# Patient Record
Sex: FEMALE | Race: WHITE | NOT HISPANIC OR LATINO | Employment: OTHER | URBAN - METROPOLITAN AREA
[De-identification: names, ages, dates, MRNs, and addresses within clinical notes are randomized per-mention and may not be internally consistent; named-entity substitution may affect disease eponyms.]

---

## 2023-03-02 ENCOUNTER — PES CALL (OUTPATIENT)
Dept: ADMINISTRATIVE | Facility: CLINIC | Age: 78
End: 2023-03-02
Payer: MEDICARE

## 2023-04-18 ENCOUNTER — OFFICE VISIT (OUTPATIENT)
Dept: URGENT CARE | Facility: CLINIC | Age: 78
End: 2023-04-18
Payer: MEDICARE

## 2023-04-18 VITALS
DIASTOLIC BLOOD PRESSURE: 90 MMHG | WEIGHT: 125 LBS | HEART RATE: 119 BPM | OXYGEN SATURATION: 96 % | TEMPERATURE: 98 F | SYSTOLIC BLOOD PRESSURE: 164 MMHG | RESPIRATION RATE: 16 BRPM

## 2023-04-18 DIAGNOSIS — R35.0 FREQUENCY OF URINATION: Primary | ICD-10-CM

## 2023-04-18 LAB
BILIRUB UR QL STRIP: NEGATIVE
GLUCOSE UR QL STRIP: NEGATIVE
KETONES UR QL STRIP: NEGATIVE
LEUKOCYTE ESTERASE UR QL STRIP: NEGATIVE
PH, POC UA: 5 (ref 5–8)
POC BLOOD, URINE: POSITIVE
POC NITRATES, URINE: NEGATIVE
PROT UR QL STRIP: NEGATIVE
SP GR UR STRIP: 1.01 (ref 1–1.03)
UROBILINOGEN UR STRIP-ACNC: ABNORMAL (ref 0.1–1.1)

## 2023-04-18 PROCEDURE — 81003 POCT URINALYSIS, DIPSTICK, AUTOMATED, W/O SCOPE: ICD-10-PCS | Mod: QW,S$GLB,, | Performed by: NURSE PRACTITIONER

## 2023-04-18 PROCEDURE — 99203 OFFICE O/P NEW LOW 30 MIN: CPT | Mod: S$GLB,,, | Performed by: NURSE PRACTITIONER

## 2023-04-18 PROCEDURE — 81003 URINALYSIS AUTO W/O SCOPE: CPT | Mod: QW,S$GLB,, | Performed by: NURSE PRACTITIONER

## 2023-04-18 PROCEDURE — 87086 URINE CULTURE/COLONY COUNT: CPT | Performed by: NURSE PRACTITIONER

## 2023-04-18 PROCEDURE — 99203 PR OFFICE/OUTPT VISIT, NEW, LEVL III, 30-44 MIN: ICD-10-PCS | Mod: S$GLB,,, | Performed by: NURSE PRACTITIONER

## 2023-04-18 NOTE — PROGRESS NOTES
Subjective:      Patient ID: Yessenia Yip is a 77 y.o. female.    Vitals:  weight is 56.7 kg (125 lb). Her oral temperature is 98.4 °F (36.9 °C). Her blood pressure is 164/90 (abnormal) and her pulse is 119 (abnormal). Her respiration is 16 and oxygen saturation is 96%.     Chief Complaint: possible uti    This is a 77 y.o. female who presents today with a chief complaint of increased urination x 1 week. Pt not experiencing urination frequency at night.  Pt took at-home test and thought it looked abnormal. Otherwise, no  fever, chills, abdominal pain, N/V/D, hematemesis, hematochezia, melena, pelvic pain, urgency, hematuria, flank pain, discharge, foul odor, lesions, rash. Denies increased oral fluids or new meTreated with cranberry juice       PMH: none    Urinary Tract Infection   The problem has been unchanged. The pain is at a severity of 0/10. The patient is experiencing no pain. There has been no fever. Associated symptoms include frequency. Pertinent negatives include no chills, flank pain, hematuria, nausea, sweats, urgency, vomiting, constipation or rash. There is no history of diabetes mellitus, hypertension, kidney stones, recurrent UTIs or a single kidney.     Constitution: Negative for chills, sweating, fatigue and fever.   Neck: Negative for painful lymph nodes.   Cardiovascular:  Negative for chest pain, palpitations and sob on exertion.   Respiratory:  Negative for chest tightness, cough and shortness of breath.    Gastrointestinal:  Negative for abdominal pain, nausea, vomiting, constipation, diarrhea, bright red blood in stool, dark colored stools and rectal bleeding.   Genitourinary:  Positive for frequency. Negative for dysuria, urgency, flank pain, hematuria, vaginal pain, vaginal discharge, vaginal bleeding, vaginal odor, genital sore and pelvic pain.   Musculoskeletal:  Negative for muscle ache.   Skin:  Negative for color change, pale and rash.   Hematologic/Lymphatic: Negative for  swollen lymph nodes.    Objective:     Physical Exam   Constitutional: She is oriented to person, place, and time. She appears well-developed.   HENT:   Head: Normocephalic and atraumatic.   Ears:   Right Ear: External ear normal.   Left Ear: External ear normal.   Nose: Nose normal.   Mouth/Throat: Mucous membranes are normal.   Eyes: Conjunctivae and lids are normal.   Neck: Trachea normal. Neck supple.   Cardiovascular: Normal rate.   Pulmonary/Chest: Effort normal. No respiratory distress.   Abdominal: Normal appearance. She exhibits no distension and no mass. Soft. There is no abdominal tenderness. There is no rebound, no guarding, no left CVA tenderness and no right CVA tenderness. No hernia.   Musculoskeletal: Normal range of motion.         General: Normal range of motion.   Neurological: She is alert and oriented to person, place, and time. She has normal strength.   Skin: Skin is warm, dry, intact, not diaphoretic and not pale.   Psychiatric: Her speech is normal and behavior is normal. Judgment and thought content normal.   Nursing note and vitals reviewed.  Results for orders placed or performed in visit on 04/18/23   POCT Urinalysis, Dipstick, Automated, W/O Scope   Result Value Ref Range    POC Blood, Urine Positive (A) Negative    POC Bilirubin, Urine Negative Negative    POC Urobilinogen, Urine NORM 0.1 - 1.1    POC Ketones, Urine Negative Negative    POC Protein, Urine Negative Negative    POC Nitrates, Urine Negative Negative    POC Glucose, Urine Negative Negative    pH, UA 5.0 5 - 8    POC Specific Gravity, Urine 1.015 1.003 - 1.029    POC Leukocytes, Urine Negative Negative       Assessment:     1. Dysuria        Plan:       Dysuria  -     POCT Urinalysis, Dipstick, Automated, W/O Scope                  Patient Instructions   See additional patient Instructions provided    Drink plenty of water  You may take AZO (Pyridium or phenazophyridine) for discomfort which is over the counter. Take as  directed on  packaging. It will turn urine bright orange but this will resolve when you stop the medication  Avoid a full bladder, do not postpone urination  Avoid deodorant soaps, body wash, bubble bath, douches, scented toilet paper, deodorant tampons or pads, feminine wipes, chronic pad use   Avoid tight, synthetic clothing, chlorine and wearing wet bathing suits for prolonged periods  Wear cotton underwear, avoid thong underwear and no underwear to bed   Take showers instead of baths and use a hair dryer on cool setting afterwards to dry   Wear cotton to exercise and shower immediately after exercise and change clothes   Perineal hygiene, wipe front to back, regular bladder habits, increase oral fluid intake at first sign of infection  Void after intercourse  Go to the ER for : fever, chills, n/v, back pain, worsening dysuria, hematuria;  Call your PCP if symptoms are not resolved at end of therapy or if new symptoms develop       Patient Instructions   - You must understand that you have received an Urgent Care treatment only and that you may be released before all of your medical problems are known or treated.   - You, the patient, will arrange for follow up care as instructed.   - If your condition worsens or fails to improve we recommend that you receive another evaluation at the ER immediately or contact your PCP to discuss your concerns or return here.     Advised on return/follow-up precautions. Advised on ER precautions. Answered all patient questions. Patient verbalized understanding and voiced agreement with current treatment plan.

## 2023-04-19 LAB — BACTERIA UR CULT: NO GROWTH

## 2023-04-20 ENCOUNTER — TELEPHONE (OUTPATIENT)
Dept: URGENT CARE | Facility: CLINIC | Age: 78
End: 2023-04-20
Payer: MEDICARE

## 2023-04-21 ENCOUNTER — TELEPHONE (OUTPATIENT)
Dept: URGENT CARE | Facility: CLINIC | Age: 78
End: 2023-04-21
Payer: MEDICARE

## 2023-04-21 NOTE — TELEPHONE ENCOUNTER
Patient called requesting urine culture results. Test results were given and patient had no questions.